# Patient Record
Sex: FEMALE | Race: WHITE | Employment: FULL TIME | URBAN - METROPOLITAN AREA
[De-identification: names, ages, dates, MRNs, and addresses within clinical notes are randomized per-mention and may not be internally consistent; named-entity substitution may affect disease eponyms.]

---

## 2018-08-09 ENCOUNTER — HOSPITAL ENCOUNTER (OUTPATIENT)
Age: 48
Discharge: HOME | End: 2018-08-11

## 2018-08-09 PROCEDURE — 86765 RUBEOLA ANTIBODY: CPT

## 2018-08-09 PROCEDURE — 86706 HEP B SURFACE ANTIBODY: CPT

## 2018-08-09 PROCEDURE — 86762 RUBELLA ANTIBODY: CPT

## 2018-08-09 PROCEDURE — 86735 MUMPS ANTIBODY: CPT

## 2018-08-09 PROCEDURE — 86787 VARICELLA-ZOSTER ANTIBODY: CPT

## 2018-08-10 LAB — HBV SURFACE AB TITR SER: REACTIVE {TITER}

## 2018-08-14 LAB
MEASLES IMMUNE (IGG): NORMAL
MUMPS AB IGG: NORMAL
RUBELLA ANTIBODY IGG: NORMAL
VARICELLA-ZOSTER VIRUS AB, IGG: NORMAL

## 2019-06-12 ENCOUNTER — OFFICE VISIT (OUTPATIENT)
Dept: PODIATRY | Age: 49
End: 2019-06-12
Payer: COMMERCIAL

## 2019-06-12 DIAGNOSIS — M79.671 PAIN IN RIGHT FOOT: Primary | ICD-10-CM

## 2019-06-12 DIAGNOSIS — B07.0 PLANTAR VERRUCA: ICD-10-CM

## 2019-06-12 DIAGNOSIS — M77.41 METATARSALGIA OF RIGHT FOOT: ICD-10-CM

## 2019-06-12 PROCEDURE — 99213 OFFICE O/P EST LOW 20 MIN: CPT | Performed by: PODIATRIST

## 2019-06-12 PROCEDURE — 17110 DESTRUCTION B9 LES UP TO 14: CPT | Performed by: PODIATRIST

## 2019-07-17 RX ORDER — IMIQUIMOD 12.5 MG/.25G
CREAM TOPICAL
Qty: 1 BOX | Refills: 1 | Status: SHIPPED | OUTPATIENT
Start: 2019-07-17 | End: 2019-07-24

## 2019-09-25 ENCOUNTER — HOSPITAL ENCOUNTER (OUTPATIENT)
Age: 49
Discharge: HOME | End: 2019-09-27
Payer: COMMERCIAL

## 2019-09-25 ENCOUNTER — OFFICE VISIT (OUTPATIENT)
Dept: PODIATRY | Age: 49
End: 2019-09-25
Payer: COMMERCIAL

## 2019-09-25 DIAGNOSIS — M79.671 PAIN IN RIGHT FOOT: ICD-10-CM

## 2019-09-25 DIAGNOSIS — M77.41 METATARSALGIA OF RIGHT FOOT: ICD-10-CM

## 2019-09-25 DIAGNOSIS — B07.0 PLANTAR VERRUCA: Primary | ICD-10-CM

## 2019-09-25 PROCEDURE — 17110 DESTRUCTION B9 LES UP TO 14: CPT | Performed by: PODIATRIST

## 2019-09-25 PROCEDURE — 88305 TISSUE EXAM BY PATHOLOGIST: CPT

## 2019-09-25 NOTE — PROGRESS NOTES
Patient in office today for wart on bottom of right foot. 19  Annie Gonzales : 1970 Sex: female  Age: 52 y.o. Patient was referred by Cody Quiroz DO    CC:    Follow-up painful wart plantar right foot    HPI:   Follow-up painful wart plantar right foot. Does continue use of once daily salinocaine with U-shaped padding. States she has not used it for the past 2 weeks that she did blister more. Denies any open wounds. Denies any significant pain today. Still has tenderness when she is walking at the end the day. Has had ongoing wart for over 10 years but has been most recently treated for the past several months. No additional pedal complaints at this time. ROS:  Const: Denies constitutional symptoms  Musculo: Denies symptoms other than stated above  Skin: Denies symptoms other than stated above       Current Outpatient Medications:     Cholecalciferol 400 UNIT TABS tablet, Take by mouth, Disp: , Rfl:     levothyroxine (SYNTHROID) 75 MCG tablet, Take 75 mcg by mouth, Disp: , Rfl:     omeprazole (PRILOSEC) 20 MG delayed release capsule, Take 20 mg by mouth, Disp: , Rfl:     Multiple Vitamin (MULTI VITAMIN PO), Take by mouth, Disp: , Rfl:     Green Tea, Camillia sinensis, (GREEN TEA EXTRACT PO), Take by mouth, Disp: , Rfl:     Cinnamon 500 MG CAPS, Take by mouth, Disp: , Rfl:   Allergies   Allergen Reactions    Sulfa Antibiotics Rash       Past Medical History:   Diagnosis Date    Hypothyroid            There were no vitals filed for this visit. Focused Lower Extremity Physical Exam:    Neurovascular examination:    Dorsalis Pedis palpable bilateral.  Posterior tibialis palpable bilateral.    Capillary Refill Time:  Immediate return  Hair growth:  Symmetrical and bilateral   Skin:  Not atrophic  Edema: No edema bilateral feet or ankles.   Neurologic:  Light touch intact bilateral.      Musculoskeletal/ Orthopedic examination:    Equinis: Absent bilateral  Dorsiflexion,

## 2019-11-13 ENCOUNTER — OFFICE VISIT (OUTPATIENT)
Dept: PODIATRY | Age: 49
End: 2019-11-13
Payer: COMMERCIAL

## 2019-11-13 DIAGNOSIS — B07.0 PLANTAR VERRUCA: Primary | ICD-10-CM

## 2019-11-13 DIAGNOSIS — M77.41 METATARSALGIA OF RIGHT FOOT: ICD-10-CM

## 2019-11-13 DIAGNOSIS — M79.671 PAIN IN RIGHT FOOT: ICD-10-CM

## 2019-11-13 PROCEDURE — 17110 DESTRUCTION B9 LES UP TO 14: CPT | Performed by: PODIATRIST

## 2019-11-13 PROCEDURE — 99213 OFFICE O/P EST LOW 20 MIN: CPT | Performed by: PODIATRIST

## 2019-12-09 ENCOUNTER — OFFICE VISIT (OUTPATIENT)
Dept: PODIATRY | Age: 49
End: 2019-12-09
Payer: COMMERCIAL

## 2019-12-09 DIAGNOSIS — M77.41 METATARSALGIA OF RIGHT FOOT: ICD-10-CM

## 2019-12-09 DIAGNOSIS — M79.671 PAIN IN RIGHT FOOT: ICD-10-CM

## 2019-12-09 DIAGNOSIS — B07.0 PLANTAR VERRUCA: Primary | ICD-10-CM

## 2019-12-09 PROCEDURE — 17110 DESTRUCTION B9 LES UP TO 14: CPT | Performed by: PODIATRIST

## 2019-12-30 ENCOUNTER — OFFICE VISIT (OUTPATIENT)
Dept: PODIATRY | Age: 49
End: 2019-12-30
Payer: COMMERCIAL

## 2019-12-30 PROCEDURE — 99999 PR OFFICE/OUTPT VISIT,PROCEDURE ONLY: CPT | Performed by: PODIATRIST

## 2019-12-30 PROCEDURE — 17110 DESTRUCTION B9 LES UP TO 14: CPT | Performed by: PODIATRIST

## 2019-12-30 NOTE — PROGRESS NOTES
Patient in office to follow up with painful wart bottom of right foot. 19  Annie Gonzales : 1970 Sex: female  Age: 52 y.o. Patient was referred by Jaida Michele DO    CC:    Follow-up painful wart plantar right foot    HPI:   Follow-up painful wart plantar right foot. Has had increased tenderness with more callus over the past several weeks. Does continue working on her feet and has noticed increased tenderness for the past few weeks the longer she is on her feet standing. Does have topical salinocaine which she does apply every several days. States she has not used it for the past few days as it has been bothering the bottom of her foot. Denies any open wounds or any drainage. ROS:  Const: Denies constitutional symptoms  Musculo: Denies symptoms other than stated above  Skin: Denies symptoms other than stated above       Current Outpatient Medications:     Cholecalciferol 400 UNIT TABS tablet, Take by mouth, Disp: , Rfl:     levothyroxine (SYNTHROID) 75 MCG tablet, Take 75 mcg by mouth, Disp: , Rfl:     omeprazole (PRILOSEC) 20 MG delayed release capsule, Take 20 mg by mouth, Disp: , Rfl:     Multiple Vitamin (MULTI VITAMIN PO), Take by mouth, Disp: , Rfl:     Green Tea, Camillia sinensis, (GREEN TEA EXTRACT PO), Take by mouth, Disp: , Rfl:     Cinnamon 500 MG CAPS, Take by mouth, Disp: , Rfl:   Allergies   Allergen Reactions    Sulfa Antibiotics Rash       Past Medical History:   Diagnosis Date    Hypothyroid            There were no vitals filed for this visit. Focused Lower Extremity Physical Exam:    Neurovascular examination:    Dorsalis Pedis palpable bilateral.  Posterior tibialis palpable bilateral.    Capillary Refill Time:  Immediate return  Hair growth:  Symmetrical and bilateral   Skin:  Not atrophic  Edema: No edema bilateral feet or ankles.   Neurologic:  Light touch intact bilateral.      Musculoskeletal/ Orthopedic examination:    Equinis: Absent

## 2020-02-03 ENCOUNTER — OFFICE VISIT (OUTPATIENT)
Dept: PODIATRY | Age: 50
End: 2020-02-03

## 2020-02-03 PROCEDURE — 99024 POSTOP FOLLOW-UP VISIT: CPT | Performed by: PODIATRIST

## 2020-02-03 NOTE — PROGRESS NOTES
strength  Wiggling toes  Negative Homans  Tenderness to palpation plantar second and third metatarsal heads right foot. Pain to palpation plantar verruca right foot-improving    Dermatology examination:    No open skin lesions or abrasions bilateral lower extremity. Significant hyperkeratotic tissue and multiple black foci consistent with plantar verruca plantar right second and third metatarsal head measuring approximately 2 cm x 1.5 cm x 0.3 cm. No open wound or drainage. Assessment and Plan:  Roro Barnes was seen today for foot pain. Diagnoses and all orders for this visit:    Plantar verruca    Metatarsalgia of right foot    Pain in right foot      Follow-up plantar wart right foot  WART TX:   Verbal informed consent was obtained   #15 blade used to debride plantar verruca and sterile manner right plantar verruca measuring approximately 2 cm x 1.5 cm x 0.3 cm. Cryo freeze applied. Band-Aid applied. Importance of continue use of U-shaped padding and use of daily salinocaine. Relatively unchanged plantar wart. Patient states she has not been applying any salinocaine. Follow-up 1 month    Return in about 1 month (around 3/3/2020). Seen By:  Shay Bernard DPM      Document was created using voice recognition software. Note was reviewed, however may contain grammatical errors.

## 2020-06-19 ENCOUNTER — HOSPITAL ENCOUNTER (OUTPATIENT)
Age: 50
Discharge: HOME | End: 2020-06-21
Payer: COMMERCIAL

## 2020-06-19 LAB
ANION GAP SERPL CALCULATED.3IONS-SCNC: 16 MMOL/L (ref 7–16)
BUN BLDV-MCNC: 13 MG/DL (ref 6–20)
CALCIUM SERPL-MCNC: 9.3 MG/DL (ref 8.6–10.2)
CHLORIDE BLD-SCNC: 101 MMOL/L (ref 98–107)
CHOLESTEROL, TOTAL: 179 MG/DL (ref 0–199)
CO2: 23 MMOL/L (ref 22–29)
CREAT SERPL-MCNC: 0.8 MG/DL (ref 0.5–1)
GFR AFRICAN AMERICAN: >60
GFR NON-AFRICAN AMERICAN: >60 ML/MIN/1.73
GLUCOSE BLD-MCNC: 104 MG/DL (ref 74–99)
HBA1C MFR BLD: 5.7 % (ref 4–5.6)
HCT VFR BLD CALC: 41.6 % (ref 34–48)
HDLC SERPL-MCNC: 46 MG/DL
HEMOGLOBIN: 14 G/DL (ref 11.5–15.5)
LDL CHOLESTEROL CALCULATED: 97 MG/DL (ref 0–99)
MCH RBC QN AUTO: 32 PG (ref 26–35)
MCHC RBC AUTO-ENTMCNC: 33.7 % (ref 32–34.5)
MCV RBC AUTO: 95.2 FL (ref 80–99.9)
PDW BLD-RTO: 12.6 FL (ref 11.5–15)
PLATELET # BLD: 284 E9/L (ref 130–450)
PMV BLD AUTO: 10.3 FL (ref 7–12)
POTASSIUM SERPL-SCNC: 4.1 MMOL/L (ref 3.5–5)
RBC # BLD: 4.37 E12/L (ref 3.5–5.5)
SODIUM BLD-SCNC: 140 MMOL/L (ref 132–146)
TRIGL SERPL-MCNC: 178 MG/DL (ref 0–149)
TSH SERPL DL<=0.05 MIU/L-ACNC: 4.39 UIU/ML (ref 0.27–4.2)
VITAMIN D 25-HYDROXY: 33 NG/ML (ref 30–100)
VLDLC SERPL CALC-MCNC: 36 MG/DL
WBC # BLD: 5.3 E9/L (ref 4.5–11.5)

## 2020-06-19 PROCEDURE — 83036 HEMOGLOBIN GLYCOSYLATED A1C: CPT

## 2020-06-19 PROCEDURE — 36415 COLL VENOUS BLD VENIPUNCTURE: CPT

## 2020-06-19 PROCEDURE — 84443 ASSAY THYROID STIM HORMONE: CPT

## 2020-06-19 PROCEDURE — 85027 COMPLETE CBC AUTOMATED: CPT

## 2020-06-19 PROCEDURE — 80048 BASIC METABOLIC PNL TOTAL CA: CPT

## 2020-06-19 PROCEDURE — 82306 VITAMIN D 25 HYDROXY: CPT

## 2020-06-19 PROCEDURE — 80061 LIPID PANEL: CPT

## 2020-10-26 ENCOUNTER — OFFICE VISIT (OUTPATIENT)
Dept: PRIMARY CARE CLINIC | Age: 50
End: 2020-10-26
Payer: COMMERCIAL

## 2020-10-26 ENCOUNTER — HOSPITAL ENCOUNTER (OUTPATIENT)
Age: 50
Discharge: HOME | End: 2020-10-28
Payer: COMMERCIAL

## 2020-10-26 VITALS
HEIGHT: 64 IN | TEMPERATURE: 98 F | OXYGEN SATURATION: 98 % | RESPIRATION RATE: 20 BRPM | SYSTOLIC BLOOD PRESSURE: 122 MMHG | HEART RATE: 64 BPM | DIASTOLIC BLOOD PRESSURE: 70 MMHG | BODY MASS INDEX: 27.31 KG/M2 | WEIGHT: 160 LBS

## 2020-10-26 PROCEDURE — 99202 OFFICE O/P NEW SF 15 MIN: CPT | Performed by: CLINICAL NURSE SPECIALIST

## 2020-10-26 PROCEDURE — U0003 INFECTIOUS AGENT DETECTION BY NUCLEIC ACID (DNA OR RNA); SEVERE ACUTE RESPIRATORY SYNDROME CORONAVIRUS 2 (SARS-COV-2) (CORONAVIRUS DISEASE [COVID-19]), AMPLIFIED PROBE TECHNIQUE, MAKING USE OF HIGH THROUGHPUT TECHNOLOGIES AS DESCRIBED BY CMS-2020-01-R: HCPCS

## 2020-10-26 RX ORDER — AZITHROMYCIN 250 MG/1
250 TABLET, FILM COATED ORAL SEE ADMIN INSTRUCTIONS
Qty: 6 TABLET | Refills: 0 | Status: SHIPPED | OUTPATIENT
Start: 2020-10-26 | End: 2020-10-31

## 2020-10-26 RX ORDER — PREDNISONE 10 MG/1
TABLET ORAL
Qty: 30 TABLET | Refills: 0 | Status: SHIPPED | OUTPATIENT
Start: 2020-10-26 | End: 2020-11-07

## 2020-10-26 RX ORDER — GUAIFENESIN 600 MG/1
600 TABLET, EXTENDED RELEASE ORAL 2 TIMES DAILY
Qty: 30 TABLET | Refills: 0 | Status: SHIPPED | OUTPATIENT
Start: 2020-10-26 | End: 2020-11-10

## 2020-10-26 ASSESSMENT — ENCOUNTER SYMPTOMS
EYES NEGATIVE: 1
SORE THROAT: 1
ALLERGIC/IMMUNOLOGIC NEGATIVE: 1
COUGH: 1
GASTROINTESTINAL NEGATIVE: 1

## 2020-10-26 NOTE — PROGRESS NOTES
Subjective:      Patient ID: Waldo Gore is a 48 y.o. female. Patient presented to the [facility] with a chief complaint of a sore throat since Saturday. Patient also complains of mild congestion and slight cough. Patient denies body aches or loss of taste or smell. Patient concerned that she was exposed to covid at her workplace. Review of Systems   Constitutional: Negative. HENT: Positive for congestion, postnasal drip and sore throat. Eyes: Negative. Respiratory: Positive for cough. Cardiovascular: Negative. Gastrointestinal: Negative. Endocrine: Negative. Genitourinary: Negative. Musculoskeletal: Negative. Skin: Negative. Allergic/Immunologic: Negative. Neurological: Negative. Hematological: Negative. Psychiatric/Behavioral: Negative. Objective:   Physical Exam  Vitals signs and nursing note reviewed. Constitutional:       General: She is not in acute distress. Appearance: Normal appearance. She is normal weight. She is not ill-appearing, toxic-appearing or diaphoretic. HENT:      Head: Normocephalic. Right Ear: Tympanic membrane and external ear normal. There is no impacted cerumen. Left Ear: Tympanic membrane and external ear normal. There is no impacted cerumen. Ears:      Comments: Erythema to bilateral ear canals     Nose: Congestion present. No rhinorrhea. Mouth/Throat:      Mouth: Mucous membranes are moist.      Pharynx: Oropharynx is clear. Posterior oropharyngeal erythema present. No oropharyngeal exudate. Eyes:      General: No scleral icterus. Right eye: No discharge. Left eye: No discharge. Extraocular Movements: Extraocular movements intact. Pupils: Pupils are equal, round, and reactive to light. Comments: Erythema to sclera bilateral eyes   Neck:      Musculoskeletal: Normal range of motion and neck supple. No neck rigidity or muscular tenderness.       Vascular: No carotid bruit. Cardiovascular:      Rate and Rhythm: Normal rate and regular rhythm. Pulses: Normal pulses. Heart sounds: Normal heart sounds. No murmur. No friction rub. No gallop. Pulmonary:      Effort: Pulmonary effort is normal. No respiratory distress. Breath sounds: Normal breath sounds. No stridor. No wheezing, rhonchi or rales. Chest:      Chest wall: No tenderness. Abdominal:      General: Abdomen is flat. Bowel sounds are normal. There is no distension. Palpations: Abdomen is soft. There is no mass. Tenderness: There is no abdominal tenderness. There is no right CVA tenderness, left CVA tenderness, guarding or rebound. Hernia: No hernia is present. Musculoskeletal: Normal range of motion. General: No swelling, tenderness, deformity or signs of injury. Right lower leg: No edema. Left lower leg: No edema. Lymphadenopathy:      Cervical: Cervical adenopathy present. Skin:     General: Skin is warm and dry. Capillary Refill: Capillary refill takes less than 2 seconds. Coloration: Skin is not jaundiced or pale. Findings: No bruising, erythema, lesion or rash. Neurological:      General: No focal deficit present. Mental Status: She is alert and oriented to person, place, and time. Cranial Nerves: No cranial nerve deficit. Sensory: No sensory deficit. Motor: No weakness. Coordination: Coordination normal.      Gait: Gait normal.      Deep Tendon Reflexes: Reflexes normal.   Psychiatric:         Mood and Affect: Mood normal.         Behavior: Behavior normal.         Thought Content: Thought content normal.         Judgment: Judgment normal.       /70   Pulse 64   Temp 98 °F (36.7 °C) (Oral)   Resp 20   Ht 5' 4\" (1.626 m)   Wt 160 lb (72.6 kg)   LMP 10/21/2020 (Exact Date)   SpO2 98%   BMI 27.46 kg/m²     Assessment:       Diagnosis Orders   1.  Acute sinusitis, recurrence not specified, unspecified location  azithromycin (ZITHROMAX) 250 MG tablet    predniSONE (DELTASONE) 10 MG tablet    guaiFENesin (MUCINEX) 600 MG extended release tablet   2. Pharyngitis, unspecified etiology  azithromycin (ZITHROMAX) 250 MG tablet    predniSONE (DELTASONE) 10 MG tablet    guaiFENesin (MUCINEX) 600 MG extended release tablet   3. Encounter for laboratory testing for COVID-19 virus  COVID-19 Ambulatory   4. Exposure to COVID-19 virus             Plan:      Reviewed medication, allergies and past medical history. Patient was nasally swabbed for covid. Advised patient to self isolate until covid testing is available. Zpack, prednisone and mucinex escribed to the pharmacy. Advised patient to follow up with the flu clinic with any concerns. Go to the ER with any worsening of her symptoms.         MANINDER Duarte - CNP

## 2020-10-28 ENCOUNTER — PATIENT MESSAGE (OUTPATIENT)
Dept: FAMILY MEDICINE CLINIC | Age: 50
End: 2020-10-28

## 2020-10-28 LAB
SARS-COV-2: NOT DETECTED
SOURCE: NORMAL

## 2020-10-28 RX ORDER — AZITHROMYCIN 250 MG/1
250 TABLET, FILM COATED ORAL SEE ADMIN INSTRUCTIONS
Qty: 6 TABLET | Refills: 0 | Status: SHIPPED | OUTPATIENT
Start: 2020-10-28 | End: 2020-11-02

## 2020-11-06 ENCOUNTER — NURSE ONLY (OUTPATIENT)
Dept: PRIMARY CARE CLINIC | Age: 50
End: 2020-11-06

## 2020-11-06 DIAGNOSIS — Z20.822 ENCOUNTER FOR LABORATORY TESTING FOR COVID-19 VIRUS: ICD-10-CM

## 2020-11-09 LAB
SARS-COV-2: NOT DETECTED
SOURCE: NORMAL

## 2020-11-30 ENCOUNTER — NURSE ONLY (OUTPATIENT)
Dept: PRIMARY CARE CLINIC | Age: 50
End: 2020-11-30

## 2020-11-30 DIAGNOSIS — B34.9 VIRAL SYNDROME: ICD-10-CM

## 2020-12-02 LAB
SARS-COV-2: NOT DETECTED
SOURCE: NORMAL

## 2021-03-03 ENCOUNTER — OFFICE VISIT (OUTPATIENT)
Dept: PODIATRY | Age: 51
End: 2021-03-03
Payer: COMMERCIAL

## 2021-03-03 DIAGNOSIS — M77.41 METATARSALGIA OF RIGHT FOOT: ICD-10-CM

## 2021-03-03 DIAGNOSIS — M79.671 PAIN IN RIGHT FOOT: ICD-10-CM

## 2021-03-03 DIAGNOSIS — B07.0 PLANTAR VERRUCA: Primary | ICD-10-CM

## 2021-03-03 PROCEDURE — 17110 DESTRUCTION B9 LES UP TO 14: CPT | Performed by: PODIATRIST

## 2021-03-03 NOTE — PROGRESS NOTES
Patient in office to follow up with wart on bottom of right foot. Lemuel Espinal : 1970 Sex: female  Age: 48 y.o. Patient was referred by Taiwo Lewis DO    CC:    Follow-up painful wart plantar right foot    HPI:   Follow-up painful wart plantar right foot. Continues having a wart on the bottom of the right foot. Has denied any recent formal treatment or therapy for over a year. Still having tenderness with callus tissue on the bottom of the right foot with history of plantar wart. Denies once again any recent over-the-counter any formal treatment or therapy. Does note some tenderness throughout the day when she is close to      ROS:  Const: Denies constitutional symptoms  Musculo: Denies symptoms other than stated above  Skin: Denies symptoms other than stated above       Current Outpatient Medications:     Cholecalciferol 400 UNIT TABS tablet, Take by mouth, Disp: , Rfl:     levothyroxine (SYNTHROID) 75 MCG tablet, Take 75 mcg by mouth, Disp: , Rfl:     Multiple Vitamin (MULTI VITAMIN PO), Take by mouth, Disp: , Rfl:     Green Tea, Camillia sinensis, (GREEN TEA EXTRACT PO), Take by mouth, Disp: , Rfl:     Cinnamon 500 MG CAPS, Take by mouth, Disp: , Rfl:   Allergies   Allergen Reactions    Sulfa Antibiotics Rash       Past Medical History:   Diagnosis Date    Hypothyroid            There were no vitals filed for this visit. Focused Lower Extremity Physical Exam:    Neurovascular examination:    Dorsalis Pedis palpable bilateral.  Posterior tibialis palpable bilateral.    Capillary Refill Time:  Immediate return  Hair growth:  Symmetrical and bilateral   Skin:  Not atrophic  Edema: No edema bilateral feet or ankles.   Neurologic:  Light touch intact bilateral.      Musculoskeletal/ Orthopedic examination:    Equinis: Absent bilateral  Dorsiflexion, plantarflexion, inversion, eversion bilateral 5 out of 5 muscle strength  Wiggling toes  Negative Homans  Tenderness to palpation plantar second third fourth metatarsal head right foot    Dermatology examination:    No open skin lesions or abrasions bilateral lower extremity. Significant hyperkeratotic tissue multiple black foci consistent with plantar verruca plantar right foot expanding from second through fourth metatarsal head right foot measuring approximately 3.5 cm x 2.5 cm x 0.5 cm. No significant bleeding after debridement. No foreign body noted. Assessment and Plan:  Sandra Cavazos was seen today for verruca vulgaris. Diagnoses and all orders for this visit:    Plantar verruca    Metatarsalgia of right foot    Pain in right foot      Follow-up plantar wart right foot    Has not had any formal treatment or over-the-counter therapy for right plantar foot wart over a year    WART TX:   Verbal informed consent was obtained   #15 blade used to debride plantar verruca and sterile manner right plantar verruca   Cryo freeze applied. Band-Aid applied. Salinocaine was dispensed with U-shaped padding once daily with Band-Aid. I will follow-up 1 month      Return in about 1 month (around 4/3/2021). Seen By:  Brittanie Haile DPM      Document was created using voice recognition software. Note was reviewed, however may contain grammatical errors.

## 2021-03-31 ENCOUNTER — OFFICE VISIT (OUTPATIENT)
Dept: PODIATRY | Age: 51
End: 2021-03-31
Payer: COMMERCIAL

## 2021-03-31 DIAGNOSIS — B07.0 PLANTAR VERRUCA: Primary | ICD-10-CM

## 2021-03-31 DIAGNOSIS — M79.671 PAIN IN RIGHT FOOT: ICD-10-CM

## 2021-03-31 DIAGNOSIS — M77.41 METATARSALGIA OF RIGHT FOOT: ICD-10-CM

## 2021-03-31 PROCEDURE — 17110 DESTRUCTION B9 LES UP TO 14: CPT | Performed by: PODIATRIST

## 2021-03-31 NOTE — PROGRESS NOTES
Patient in office to follow up with wart on bottom of right foot. Kieran Hernandez : 1970 Sex: female  Age: 48 y.o. Patient was referred by Keon Acharya DO    CC:    Follow-up painful wart plantar right foot    HPI:   Follow-up painful wart plantar right foot. Continue Salinocaine once daily. Has noticed improvement. States she did notice some dry skin follow-up this week. Does continue offloading padding with Band-Aid. Continues working on her feet. Denies any open or bleeding wounds. No additional pedal complaints. ROS:  Const: Denies constitutional symptoms  Musculo: Denies symptoms other than stated above  Skin: Denies symptoms other than stated above       Current Outpatient Medications:     Cholecalciferol 400 UNIT TABS tablet, Take by mouth, Disp: , Rfl:     levothyroxine (SYNTHROID) 75 MCG tablet, Take 75 mcg by mouth, Disp: , Rfl:     Multiple Vitamin (MULTI VITAMIN PO), Take by mouth, Disp: , Rfl:     Green Tea, Camillia sinensis, (GREEN TEA EXTRACT PO), Take by mouth, Disp: , Rfl:     Cinnamon 500 MG CAPS, Take by mouth, Disp: , Rfl:   Allergies   Allergen Reactions    Sulfa Antibiotics Rash       Past Medical History:   Diagnosis Date    Hypothyroid            There were no vitals filed for this visit. Focused Lower Extremity Physical Exam:    Neurovascular examination:    Dorsalis Pedis palpable bilateral.  Posterior tibialis palpable bilateral.    Capillary Refill Time:  Immediate return  Hair growth:  Symmetrical and bilateral   Skin:  Not atrophic  Edema: No edema bilateral feet or ankles.   Neurologic:  Light touch intact bilateral.      Musculoskeletal/ Orthopedic examination:    Equinis: Absent bilateral  Dorsiflexion, plantarflexion, inversion, eversion bilateral 5 out of 5 muscle strength  Wiggling toes  Negative Homans  Tenderness to palpation plantar second third fourth metatarsal head right foot    Dermatology examination:    No open skin lesions or abrasions bilateral lower extremity. Mild hyperkeratotic tissue multiple black foci consistent with plantar verruca plantar right foot expanding from second through fourth metatarsal head right foot measuring approximately 3.0 cm x 2.5 cm x 0.3 cm. No bleeding after debridement    Assessment and Plan:  Damien Johnston was seen today for verruca vulgaris. Diagnoses and all orders for this visit:    Plantar verruca    Metatarsalgia of right foot    Pain in right foot      Follow-up plantar wart right foot  WART TX:   Verbal informed consent was obtained   #15 blade used to debride plantar verruca and sterile manner right plantar verruca   Cryo freeze applied. Band-Aid applied. Salinocaine was dispensed with U-shaped padding once daily with Band-Aid. I did dispense new offloading padding. Continue Salinocaine once daily with Band-Aid. I will follow-up once again 1 month. Return in about 1 month (around 4/30/2021). Seen By:  Rajani Bragg DPM      Document was created using voice recognition software. Note was reviewed, however may contain grammatical errors.

## 2021-04-21 ENCOUNTER — OFFICE VISIT (OUTPATIENT)
Dept: PODIATRY | Age: 51
End: 2021-04-21
Payer: COMMERCIAL

## 2021-04-21 VITALS — BODY MASS INDEX: 27.31 KG/M2 | WEIGHT: 160 LBS | HEIGHT: 64 IN

## 2021-04-21 DIAGNOSIS — M77.41 METATARSALGIA OF RIGHT FOOT: ICD-10-CM

## 2021-04-21 DIAGNOSIS — M79.671 PAIN IN RIGHT FOOT: ICD-10-CM

## 2021-04-21 DIAGNOSIS — B07.0 PLANTAR VERRUCA: Primary | ICD-10-CM

## 2021-04-21 PROCEDURE — 17110 DESTRUCTION B9 LES UP TO 14: CPT | Performed by: PODIATRIST

## 2021-04-21 NOTE — PROGRESS NOTES
Patient in office to follow up with wart on bottom of right foot.       Davee Plan : 1970 Sex: female  Age: 48 y.o. Patient was referred by Robert Rebolledo DO    CC:    Follow-up painful wart plantar right foot    HPI:   Follow-up painful wart plantar right foot. Has noticed improvement since last visit. Has not used lidocaine for the past week. Presents with regular shoes on. Still having some tenderness on the bottom of the right foot but once again decreased overall size and callus tissue on the bottom of the right foot. No new injuries. ROS:  Const: Denies constitutional symptoms  Musculo: Denies symptoms other than stated above  Skin: Denies symptoms other than stated above       Current Outpatient Medications:     Cholecalciferol 400 UNIT TABS tablet, Take by mouth, Disp: , Rfl:     levothyroxine (SYNTHROID) 75 MCG tablet, Take 75 mcg by mouth, Disp: , Rfl:     Multiple Vitamin (MULTI VITAMIN PO), Take by mouth, Disp: , Rfl:     Green Tea, Camillia sinensis, (GREEN TEA EXTRACT PO), Take by mouth, Disp: , Rfl:     Cinnamon 500 MG CAPS, Take by mouth, Disp: , Rfl:   Allergies   Allergen Reactions    Sulfa Antibiotics Rash       Past Medical History:   Diagnosis Date    Hypothyroid            Vitals:    21 0831   Weight: 160 lb (72.6 kg)   Height: 5' 4\" (1.626 m)         Focused Lower Extremity Physical Exam:    Neurovascular examination:    Dorsalis Pedis palpable bilateral.  Posterior tibialis palpable bilateral.    Capillary Refill Time:  Immediate return  Hair growth:  Symmetrical and bilateral   Skin:  Not atrophic  Edema: No edema bilateral feet or ankles. Neurologic:  Light touch intact bilateral.      Musculoskeletal/ Orthopedic examination:    Equinis: Absent bilateral  Dorsiflexion, plantarflexion, inversion, eversion bilateral 5 out of 5 muscle strength  Wiggling toes  Negative Homans  Mild tenderness plantar second and third metatarsal head right foot. Decreased from last visit. Dermatology examination:    No open skin lesions or abrasions bilateral lower extremity. Mild hyperkeratotic tissue multiple black foci consistent with plantar verruca plantar right foot expanding from second through fourth metatarsal head right foot measuring approximately 2.5 cm x 6 1.5 cm x 0.1 cm. Still surrounding hyperkeratotic tissue but decreased from last visit. Assessment and Plan:  Charissa Fernandez was seen today for verruca vulgaris. Diagnoses and all orders for this visit:    Plantar verruca    Metatarsalgia of right foot    Pain in right foot      Follow-up plantar wart right foot    WART TX:   Verbal informed consent was obtained   #15 blade used to debride plantar verruca and sterile manner right plantar verruca   Cryo freeze applied. Band-Aid applied. Improving overall. Decrease in overall size and decrease in overall pain. Importance of offloading padding. Importance of Salinocaine daily. Follow-up 1 month. Return in about 1 month (around 5/21/2021). Seen By:  Michael Nunez DPM      Document was created using voice recognition software. Note was reviewed, however may contain grammatical errors.

## 2021-04-27 NOTE — PROGRESS NOTES
19  Annie Gonzales : 1970 Sex: female  Age: 52 y.o. Patient was referred by Dixie French DO    CC:    Follow-up painful wart plantar right foot    HPI:   Follow-up painful wart plantar right foot. Has had wart over several years. Has tried multiple conservative treatment options. Denies any recent use of topicals lidocaine but has tried this multiple times in the past.  Denies any open wounds or drainage. Does have offloading you padding which she does wear in her shoes which do seem to help some. No additional pedal complaints at this time. ROS:  Const: Denies constitutional symptoms  Musculo: Denies symptoms other than stated above  Skin: Denies symptoms other than stated above       Current Outpatient Medications:     Cholecalciferol 400 UNIT TABS tablet, Take by mouth, Disp: , Rfl:     levothyroxine (SYNTHROID) 75 MCG tablet, Take 75 mcg by mouth, Disp: , Rfl:     omeprazole (PRILOSEC) 20 MG delayed release capsule, Take 20 mg by mouth, Disp: , Rfl:     Multiple Vitamin (MULTI VITAMIN PO), Take by mouth, Disp: , Rfl:     Green Tea, Camillia sinensis, (GREEN TEA EXTRACT PO), Take by mouth, Disp: , Rfl:     Cinnamon 500 MG CAPS, Take by mouth, Disp: , Rfl:   Allergies   Allergen Reactions    Sulfa Antibiotics Rash       Past Medical History:   Diagnosis Date    Hypothyroid            There were no vitals filed for this visit. Work History/Social History:     Focused Lower Extremity Physical Exam:    Neurovascular examination:    Dorsalis Pedis palpable bilateral.  Posterior tibialis palpable bilateral.    Capillary Refill Time:  Immediate return  Hair growth:  Symmetrical and bilateral   Skin:  Not atrophic  Edema: No edema bilateral feet or ankles.   Neurologic:  Light touch intact bilateral.      Musculoskeletal/ Orthopedic examination:    Equinis: Absent bilateral  Dorsiflexion, plantarflexion, inversion, eversion bilateral 5 out of 5 muscle strength  Wiggling toes  Negative Homans  Tenderness to palpation plantar second and third metatarsal heads right foot. Pain to palpation plantar verruca right foot    Dermatology examination:    No open skin lesions or abrasions bilateral lower extremity. Significant hyperkeratotic tissue and multiple black foci consistent with plantar verruca plantar right second and third metatarsal head measuring approximately 3 cm x 2 cm x 1 cm. No open wound or drainage. Multiple black foci with likely plantar verruca right foot. Assessment and Plan:  Diagnoses and all orders for this visit:    Pain in right foot    Plantar verruca    Metatarsalgia of right foot    WART TX:   Verbal informed consent was obtained   #15 blade used to debride plantar verruca and sterile manner right plantar verruca measuring approximately 3 cm x 2 cm x 1 cm. Cryo-freeze applied to plantar verruca. Patient tolerated well. Band-aid applied. Salinocaine dispensed. Apply once daily with band-aid as directed. The patient tolerated the procedure well and was given proper instruction for continued care. U-shaped padding dispensed. Follow-up 1 month    No follow-ups on file. Seen By:  Antonio Demarco DPM      Document was created using voice recognition software. Note was reviewed, however may contain grammatical errors. Debridement Text (Will Only Render In Visit Note If You Select Debridement Option Under Who Performed The Pdt Field): Prior to application of the photodynamic medication the hyperkeratotic lesions were curetted to make them more amenable to therapy.

## 2021-06-09 ENCOUNTER — OFFICE VISIT (OUTPATIENT)
Dept: PODIATRY | Age: 51
End: 2021-06-09
Payer: COMMERCIAL

## 2021-06-09 DIAGNOSIS — B07.0 PLANTAR VERRUCA: Primary | ICD-10-CM

## 2021-06-09 DIAGNOSIS — M79.671 PAIN IN RIGHT FOOT: ICD-10-CM

## 2021-06-09 DIAGNOSIS — M77.41 METATARSALGIA OF RIGHT FOOT: ICD-10-CM

## 2021-06-09 PROCEDURE — 17110 DESTRUCTION B9 LES UP TO 14: CPT | Performed by: PODIATRIST

## 2021-06-09 RX ORDER — ASCORBATE CALCIUM 500 MG
TABLET ORAL
COMMUNITY
Start: 2020-09-16

## 2021-06-09 RX ORDER — ERGOCALCIFEROL (VITAMIN D2) 1250 MCG
CAPSULE ORAL
COMMUNITY
Start: 2020-09-16

## 2021-06-09 NOTE — PROGRESS NOTES
Patient in office to follow up with wart on bottom of right foot.        Nabil Sosa : 1970 Sex: female  Age: 46 y.o. Patient was referred by Adeola Ricks DO    CC:    Follow-up painful wart plantar right foot    HPI:   Follow-up painful wart plantar right foot. Has noticed improvement since last visit where she did have the wart froze. Denies open wounds. Denies calf pain. Postop some tenderness on the bottom of the right foot at the end of workday. Does have salinocaine but has not been applying for the past several weeks. ROS:  Const: Denies constitutional symptoms  Musculo: Denies symptoms other than stated above  Skin: Denies symptoms other than stated above       Current Outpatient Medications:     Calcium Ascorbate 500 MG TABS, Ascorbate Calcium Active 1000 MG Oral Daily 2020 11:40am, Disp: , Rfl:     ergocalciferol (ERGOCALCIFEROL) 1.25 MG (49395 UT) capsule, Ergocalciferol (Vitamin D2) Active 80633 UNITS Oral Weekly 2020 11:40am, Disp: , Rfl:     Zinc Acetate (GALZIN) 25 MG capsule, Zinc Acetate Active 25 MG Oral Daily 2020 11:42am, Disp: , Rfl:     Cholecalciferol 400 UNIT TABS tablet, Take by mouth, Disp: , Rfl:     levothyroxine (SYNTHROID) 75 MCG tablet, Take 75 mcg by mouth, Disp: , Rfl:     Multiple Vitamin (MULTI VITAMIN PO), Take by mouth, Disp: , Rfl:     Green Tea, Camillia sinensis, (GREEN TEA EXTRACT PO), Take by mouth, Disp: , Rfl:     Cinnamon 500 MG CAPS, Take by mouth, Disp: , Rfl:   Allergies   Allergen Reactions    Sulfa Antibiotics Rash       Past Medical History:   Diagnosis Date    Hypothyroid            There were no vitals filed for this visit.       Focused Lower Extremity Physical Exam:    Neurovascular examination:    Dorsalis Pedis palpable bilateral.  Posterior tibialis palpable bilateral.    Capillary Refill Time:  Immediate return  Hair growth:  Symmetrical and bilateral   Skin:  Not atrophic  Edema: No edema bilateral feet or ankles. Neurologic:  Light touch intact bilateral.      Musculoskeletal/ Orthopedic examination:    Equinis: Absent bilateral  Dorsiflexion, plantarflexion, inversion, eversion bilateral 5 out of 5 muscle strength  Wiggling toes  Negative Homans  Tenderness plantar second and third metatarsal right foot where there is significant hyperkeratotic tissue and likely plantar verruca. Dermatology examination:    No open skin lesions or abrasions bilateral lower extremity. Mild hyperkeratotic tissue multiple black foci consistent with plantar verruca plantar right foot expanding from second through fourth metatarsal head right foot measuring approximately 2 cm x 1 cm x 0.2 cm. Surrounding hyperkeratotic tissue. No bleeding noted. Assessment and Plan:  Bobo Kovacs was seen today for verruca vulgaris. Diagnoses and all orders for this visit:    Plantar verruca    Metatarsalgia of right foot    Pain in right foot      Follow-up plantar wart right foot    WART TX:   Verbal informed consent was obtained   #15 blade used to debride plantar verruca and sterile manner right plantar verruca   Cryo freeze applied. Band-Aid applied. I still did recommend salinocaine once daily. Continue offloading padding. Significant improvement in overall appearance right foot wart. I will follow-up 1 month    Return in about 1 month (around 7/9/2021). Seen By:  Ace Sanchez DPM      Document was created using voice recognition software. Note was reviewed, however may contain grammatical errors.

## 2021-08-04 ENCOUNTER — OFFICE VISIT (OUTPATIENT)
Dept: PODIATRY | Age: 51
End: 2021-08-04
Payer: COMMERCIAL

## 2021-08-04 DIAGNOSIS — M79.671 PAIN IN RIGHT FOOT: ICD-10-CM

## 2021-08-04 DIAGNOSIS — M77.41 METATARSALGIA OF RIGHT FOOT: ICD-10-CM

## 2021-08-04 DIAGNOSIS — B07.0 PLANTAR VERRUCA: Primary | ICD-10-CM

## 2021-08-04 PROCEDURE — 17110 DESTRUCTION B9 LES UP TO 14: CPT | Performed by: PODIATRIST

## 2021-08-04 RX ORDER — LEVOTHYROXINE SODIUM 0.05 MG/1
TABLET ORAL
COMMUNITY
Start: 2021-05-26 | End: 2021-08-04

## 2021-08-04 NOTE — PROGRESS NOTES
Patient in office to follow up with painful wart bottom of right foot. Geo Valero : 1970 Sex: female  Age: 46 y.o. Patient was referred by Soham Gomez DO    CC:    Follow-up painful wart plantar right foot      HPI:   Follow-up painful wart plantar right foot. Has been pleased with overall progression. Denies any open or draining wounds. Did recently run out of some medication. Does have U-shaped padding which she has been tolerating well. No additional pedal complaints. .      ROS:  Const: Denies constitutional symptoms  Musculo: Denies symptoms other than stated above  Skin: Denies symptoms other than stated above       Current Outpatient Medications:     Calcium Ascorbate 500 MG TABS, Ascorbate Calcium Active 1000 MG Oral Daily 2020 11:40am, Disp: , Rfl:     ergocalciferol (ERGOCALCIFEROL) 1.25 MG (63384 UT) capsule, Ergocalciferol (Vitamin D2) Active 26062 UNITS Oral Weekly 2020 11:40am, Disp: , Rfl:     Zinc Acetate (GALZIN) 25 MG capsule, Zinc Acetate Active 25 MG Oral Daily 2020 11:42am, Disp: , Rfl:     Cholecalciferol 400 UNIT TABS tablet, Take by mouth, Disp: , Rfl:     levothyroxine (SYNTHROID) 75 MCG tablet, Take 75 mcg by mouth, Disp: , Rfl:     Multiple Vitamin (MULTI VITAMIN PO), Take by mouth, Disp: , Rfl:     Green Tea, Camillia sinensis, (GREEN TEA EXTRACT PO), Take by mouth, Disp: , Rfl:     Cinnamon 500 MG CAPS, Take by mouth, Disp: , Rfl:   Allergies   Allergen Reactions    Sulfa Antibiotics Rash       Past Medical History:   Diagnosis Date    Hypothyroid            There were no vitals filed for this visit.       Focused Lower Extremity Physical Exam:    Neurovascular examination:    Dorsalis Pedis palpable bilateral.  Posterior tibialis palpable bilateral.    Capillary Refill Time:  Immediate return  Hair growth:  Symmetrical and bilateral   Skin:  Not atrophic  Edema: No edema bilateral feet or ankles. Neurologic:  Light touch intact bilateral.      Musculoskeletal/ Orthopedic examination:    Equinis: Absent bilateral  Dorsiflexion, plantarflexion, inversion, eversion bilateral 5 out of 5 muscle strength  Wiggling toes  Negative Homans    Mild tenderness plantar second third metatarsal head right foot. Significant improvement. Dermatology examination:    No open skin lesions or abrasions bilateral lower extremity. Hyperkeratotic tissue plantar second and third metatarsal head right foot. After paring no plantar verruca noted. Mild bleeding. Assessment and Plan:  Aye Cobian was seen today for verruca vulgaris. Diagnoses and all orders for this visit:    Plantar verruca    Metatarsalgia of right foot    Pain in right foot      Follow-up plantar wart right foot    WART TX:   Verbal informed consent was obtained   #15 blade used to debride plantar verruca and sterile manner right plantar verruca   Cryo freeze applied. Band-Aid applied. After debridement of plantar wart no significant bleeding noted. Wart appears to be gone at this time. Progressing well. We did dispense new Salinocaine to be applied every other day. I will follow-up 1 month to monitor progression. Significant improvement with no visible sign of plantar wart noted today. Continue U-shaped offloading padding which was dispensed again today. Return in about 1 month (around 9/4/2021). Seen By:  Erica Lara DPM      Document was created using voice recognition software. Note was reviewed, however may contain grammatical errors.